# Patient Record
Sex: FEMALE | Race: WHITE | Employment: FULL TIME | URBAN - METROPOLITAN AREA
[De-identification: names, ages, dates, MRNs, and addresses within clinical notes are randomized per-mention and may not be internally consistent; named-entity substitution may affect disease eponyms.]

---

## 2023-09-06 ENCOUNTER — OFFICE VISIT (OUTPATIENT)
Age: 48
End: 2023-09-06
Payer: COMMERCIAL

## 2023-09-06 VITALS
WEIGHT: 238 LBS | BODY MASS INDEX: 38.25 KG/M2 | HEIGHT: 66 IN | HEART RATE: 73 BPM | OXYGEN SATURATION: 97 % | TEMPERATURE: 97.3 F

## 2023-09-06 DIAGNOSIS — M47.812 CERVICAL SPONDYLOSIS WITHOUT MYELOPATHY: ICD-10-CM

## 2023-09-06 DIAGNOSIS — M54.12 CERVICAL NEURITIS: ICD-10-CM

## 2023-09-06 DIAGNOSIS — M96.1 CERVICAL POST-LAMINECTOMY SYNDROME: Primary | ICD-10-CM

## 2023-09-06 PROBLEM — R71.8 MICROCYTIC RED BLOOD CELLS: Status: ACTIVE | Noted: 2023-09-06

## 2023-09-06 PROBLEM — M47.12 CERVICAL SPONDYLOSIS WITH MYELOPATHY: Status: ACTIVE | Noted: 2020-12-16

## 2023-09-06 PROBLEM — M54.6 THORACIC BACK PAIN: Status: ACTIVE | Noted: 2023-09-06

## 2023-09-06 PROBLEM — M54.2 NECK PAIN: Status: ACTIVE | Noted: 2018-10-30

## 2023-09-06 PROBLEM — M25.519 SHOULDER JOINT PAIN: Status: ACTIVE | Noted: 2023-09-06

## 2023-09-06 PROBLEM — I10 ESSENTIAL HYPERTENSION: Status: ACTIVE | Noted: 2019-03-12

## 2023-09-06 PROCEDURE — 99204 OFFICE O/P NEW MOD 45 MIN: CPT | Performed by: PHYSICAL MEDICINE & REHABILITATION

## 2023-09-06 RX ORDER — CYCLOBENZAPRINE HCL 10 MG
10 TABLET ORAL 3 TIMES DAILY PRN
COMMUNITY

## 2023-09-06 RX ORDER — INSULIN GLARGINE 100 [IU]/ML
INJECTION, SOLUTION SUBCUTANEOUS
COMMUNITY
Start: 2023-07-06

## 2023-09-06 RX ORDER — DULAGLUTIDE 0.75 MG/.5ML
INJECTION, SOLUTION SUBCUTANEOUS
COMMUNITY
Start: 2023-07-06

## 2023-09-06 RX ORDER — BACLOFEN 10 MG/1
TABLET ORAL
COMMUNITY
Start: 2023-06-02

## 2023-09-06 RX ORDER — GABAPENTIN 100 MG/1
100 CAPSULE ORAL 3 TIMES DAILY
Qty: 90 CAPSULE | Refills: 1 | Status: SHIPPED | OUTPATIENT
Start: 2023-09-06 | End: 2023-11-05

## 2023-09-06 RX ORDER — ATORVASTATIN CALCIUM 20 MG/1
20 TABLET, FILM COATED ORAL
COMMUNITY
Start: 2023-08-25

## 2023-09-06 RX ORDER — BENZOYL PEROXIDE 100 MG/ML
LIQUID TOPICAL
COMMUNITY
Start: 2023-08-09

## 2023-09-08 ENCOUNTER — HOSPITAL ENCOUNTER (OUTPATIENT)
Facility: HOSPITAL | Age: 48
Setting detail: RECURRING SERIES
Discharge: HOME | End: 2023-09-11
Payer: COMMERCIAL

## 2023-09-08 PROCEDURE — 97162 PT EVAL MOD COMPLEX 30 MIN: CPT

## 2023-09-08 NOTE — PROGRESS NOTES
PHYSICAL / OCCUPATIONAL THERAPY - DAILY TREATMENT NOTE (updated )  For Eval visit    Patient Name: Rabia Cano    Date: 2023    : 1975  Insurance: Payor: Paul Grossman / Plan: [employer] HMO / Product Type: *No Product type* /      Patient  verified yes     Visit #   Current / Total 1 12   Time   In / Out 820 900   Pain   In / Out 6/10 6/10   Subjective Functional Status/Changes: See POC     TREATMENT AREA =  see POC    OBJECTIVE           30 min   Eval - untimed                      Therapeutic Procedures: Tx Min Billable or 1:1 Min (if diff from Tx Min) Procedure, Rationale, Specifics   10  85202 Therapeutic Exercise (timed):  increase ROM, strength, coordination, balance, and proprioception to improve patient's ability to progress to PLOF and address remaining functional goals.  (see flow sheet as applicable)     Details if applicable:              Details if applicable:            Details if applicable:            Details if applicable:            Details if applicable:     10  [facility] Totals Reminder: bill using total billable min of TIMED therapeutic procedures (example: do not include dry needle or estim unattended, both untimed codes, in totals to left)  8-22 min = 1 unit; 23-37 min = 2 units; 38-52 min = 3 units; 53-67 min = 4 units; 68-82 min = 5 units   Total Total     [x]  Patient Education billed concurrently with other procedures   [x] Review HEP    [] Progressed/Changed HEP, detail:    [] Other detail:       Objective Information/Functional Measures/Assessment    See POC    Patient will continue to benefit from skilled PT / OT services to modify and progress therapeutic interventions, analyze and address functional mobility deficits, analyze and address ROM deficits, analyze and address strength deficits, analyze and address soft tissue restrictions, analyze and cue for proper movement patterns, and analyze and modify for postural abnormalities to address functional deficits and attain

## 2023-09-08 NOTE — THERAPY EVALUATION
4/5 147 160   ER 4-/5 4-/5 VALENTE T2 VALENTE T2   IR 4/5 4/5 FIR T10 FIR L1   Elbow Left Right Left Right   Extension 4-/5 4-/5     Flexion 4/5 4/5     Wrist Left Right Left Right    5 lbs 5 lbs            PALPATION: Tenderness to B rhomboids, B cervical paraspinals, B scalenes, B UT/LS  SPECIAL TEST:(+) ULTT (Ulnar n. Bias). Pt presents w/ sx suggesting/consistent with cervical radiculopathy. Pt could benefit from PT to address impairments and functional limitations in order to improve pt's ability to complete ADLs.  ===========================================================================================  Evaluation Complexity:  History:  HIGH Complexity :3+ comorbidities / personal factors will impact the outcome/ POC ; Examination:  HIGH Complexity : 4+ Standardized tests and measures addressing body structure, function, activity limitation and / or participation in recreation  ;Presentation:  MEDIUM Complexity : Evolving with changing characteristics  ; Clinical Decision Making:  MEDIUM Complexity : FOTO score of 26-74 FOTO score = an established functional score where 100 = no disability  Overall Complexity Rating: MEDIUM  Problem List: pain affecting function, decrease ROM, decrease strength, decrease ADL/functional abilities, decrease activity tolerance, and decrease flexibility/joint mobility   Treatment Plan may include any combination of the followin Therapeutic Exercise, 22717 Neuromuscular Re-Education, 38012 Manual Therapy, 66865 Therapeutic Activity, 49880 Self Care/Home Management, and 60606 Electrical Stim unattended  Patient / Family readiness to learn indicated by: asking questions, trying to perform skills, interest, return verbalization , and return demonstration   Persons(s) to be included in education: patient (P)  Barriers to Learning/Limitations: none  Measures taken if barriers to learning present: n/a  Patient Goal (s): pain relief  Patient Self Reported Health Status:

## 2023-09-21 ENCOUNTER — HOSPITAL ENCOUNTER (OUTPATIENT)
Facility: HOSPITAL | Age: 48
Setting detail: RECURRING SERIES
Discharge: HOME | End: 2023-09-24
Payer: COMMERCIAL

## 2023-09-21 PROCEDURE — 97140 MANUAL THERAPY 1/> REGIONS: CPT

## 2023-09-21 PROCEDURE — 97110 THERAPEUTIC EXERCISES: CPT

## 2023-09-21 PROCEDURE — 97530 THERAPEUTIC ACTIVITIES: CPT

## 2023-09-21 NOTE — PROGRESS NOTES
10/3/2023  8:20 AM Rg Sensor, PTA [facility]   10/5/2023  9:00 AM Rg Sensor, PTA [facility]   10/9/2023  9:40 AM Rg Sensor, PTA [facility]   10/13/2023  8:20 AM Thayer Sensor, PTA [facility]   10/18/2023 10:30 AM MD CLEMENTINA Lemos BS AMB

## 2023-09-25 ENCOUNTER — APPOINTMENT (OUTPATIENT)
Facility: HOSPITAL | Age: 48
End: 2023-09-25
Payer: COMMERCIAL

## 2023-09-28 ENCOUNTER — HOSPITAL ENCOUNTER (OUTPATIENT)
Facility: HOSPITAL | Age: 48
Setting detail: RECURRING SERIES
End: 2023-09-28
Payer: COMMERCIAL

## 2023-09-28 PROCEDURE — 97530 THERAPEUTIC ACTIVITIES: CPT

## 2023-09-28 PROCEDURE — 97110 THERAPEUTIC EXERCISES: CPT

## 2023-09-28 PROCEDURE — 97140 MANUAL THERAPY 1/> REGIONS: CPT

## 2023-09-28 NOTE — PROGRESS NOTES
PHYSICAL / OCCUPATIONAL THERAPY - DAILY TREATMENT NOTE (updated )    Patient Name: Magy Salas    Date: 2023    : 1975  Insurance: Payor: Edilma Haywood / Plan: OPTIMA HMO / Product Type: *No Product type* /      Patient  verified Yes     Visit #   Current / Total 3 12   Time   In / Out 10:20 11:27   Pain   In / Out 7/10 5/10   Subjective Functional Status/Changes: I have had a bad week this week, I was sick over the weekend and I was confined to the bed for a few days and  then I had a Zoom meeting on Monday which irritated the pain and numbness in my arms on and off as a result. TREATMENT AREA =  Cervicalgia [M54.2]    OBJECTIVE    Modalities Rationale:     decrease pain and increase tissue extensibility to improve patient's ability to progress to PLOF and address remaining functional goals. min [] Estim Unattended, type/location:                                      []  w/ice    []  w/heat    min [] Estim Attended, type/location:                                     []  w/US     []  w/ice    []  w/heat    []  TENS insruct      min []  Mechanical Traction: type/lbs                   []  pro   []  sup   []  int   []  cont    []  before manual    []  after manual    min []  Ultrasound, settings/location:     10 min  unbill []  Ice     [x]  Heat    location/position: Cervical/seated     min []  Paraffin,  details:     min []  Vasopneumatic Device, press/temp:     min []  Rylee Tucker / Melody Mehnaz: If using vaso (only need to measure limb vaso being performed on)      pre-treatment girth :       post-treatment girth :       measured at (landmark location) :      min []  Other:    Skin assessment post-treatment:   Redness, no adverse reactions      Therapeutic Procedures:     Tx Min Billable or 1:1 Min (if diff from Tx Min) Procedure, Rationale, Specifics   33  34900 Therapeutic Exercise (timed):  increase ROM, strength, coordination, balance, and proprioception to improve patient's ability to progress

## 2023-10-03 ENCOUNTER — HOSPITAL ENCOUNTER (OUTPATIENT)
Facility: HOSPITAL | Age: 48
Setting detail: RECURRING SERIES
Discharge: HOME | End: 2023-10-06
Payer: COMMERCIAL

## 2023-10-03 PROCEDURE — 97530 THERAPEUTIC ACTIVITIES: CPT

## 2023-10-03 PROCEDURE — 97140 MANUAL THERAPY 1/> REGIONS: CPT

## 2023-10-03 PROCEDURE — 97110 THERAPEUTIC EXERCISES: CPT

## 2023-10-03 NOTE — PROGRESS NOTES
PHYSICAL / OCCUPATIONAL THERAPY - DAILY TREATMENT NOTE (updated )    Patient Name: Gideon Quevedo    Date: 10/3/2023    : 1975  Insurance: Payor: Freida Olivas / Plan: OPTIMA HMO / Product Type: *No Product type* /      Patient  verified Yes     Visit #   Current / Total 4 12   Time   In / Out 8:22 9:23   Pain   In / Out 6/10 4-5/10   Subjective Functional Status/Changes: My neck felt really good for several hours after I left here last time before it tightened up later on that night. I have also still been feeling the numbness and tingling going down      TREATMENT AREA =  Cervicalgia [M54.2]    OBJECTIVE    Modalities Rationale:     decrease pain and increase tissue extensibility to improve patient's ability to progress to PLOF and address remaining functional goals. min [] Estim Unattended, type/location:                                      []  w/ice    []  w/heat    min [] Estim Attended, type/location:                                     []  w/US     []  w/ice    []  w/heat    []  TENS insruct      min []  Mechanical Traction: type/lbs                   []  pro   []  sup   []  int   []  cont    []  before manual    []  after manual    min []  Ultrasound, settings/location:     10 min  unbill []  Ice     [x]  Heat    location/position: Cervical/lumbar in sitting     min []  Paraffin,  details:     min []  Vasopneumatic Device, press/temp:     min []  Roseline Martinez / Oanh Lasso: If using vaso (only need to measure limb vaso being performed on)      pre-treatment girth :       post-treatment girth :       measured at (landmark location) :      min []  Other:    Skin assessment post-treatment:   Redness, no adverse reactions      Therapeutic Procedures:     Tx Min Billable or 1:1 Min (if diff from Tx Min) Procedure, Rationale, Specifics   21  19759 Therapeutic Exercise (timed):  increase ROM, strength, coordination, balance, and proprioception to improve patient's ability to progress to PLOF and address

## 2023-10-05 ENCOUNTER — APPOINTMENT (OUTPATIENT)
Facility: HOSPITAL | Age: 48
End: 2023-10-05
Payer: COMMERCIAL

## 2023-10-06 ENCOUNTER — HOSPITAL ENCOUNTER (OUTPATIENT)
Facility: HOSPITAL | Age: 48
Setting detail: RECURRING SERIES
Discharge: HOME | End: 2023-10-09
Payer: COMMERCIAL

## 2023-10-06 PROCEDURE — 97530 THERAPEUTIC ACTIVITIES: CPT

## 2023-10-06 PROCEDURE — 97110 THERAPEUTIC EXERCISES: CPT

## 2023-10-06 NOTE — PROGRESS NOTES
[id number] [employer] [facility]  [address] [address]  Phone: [phone]   [phone]  PHYSICAL THERAPY PROGRESS NOTE  Patient Name: Deven Ramirez : 1975   Treatment/Medical Diagnosis: Cervicalgia [M54.2]   Referral Source: Maine Ambrose MD     Date of Initial Visit: 23 Attended Visits: 5 Missed Visits: 1     SUMMARY OF TREATMENT  Therapeutic exercise for cervical mobility and strengthening, postural awareness, cervicothoracic stabilization, manual intervention, moist heat, patient education and HEP. CURRENT STATUS  % improvement: 58-60%  Max pain 9/10 first thing in the morning with initial activity upon rising this dissipates with activity as well as with prolonged/repetitive reaching and lifting type ADL's. Avg pain 6/10  Pt also reports intermittent bilateral R>L UE radicular burning/numbness/tingling/paresthesia down to all digits at time especially with prolonged/repetitive UE involved ADL's. Current improvements: Pt reports the most functional improvement with slight increase in cervical mobility since initial evaluation. Remaining functional limitations: Increased limitations/pain/symptoms first thing in the morning with initial activity upon rising this dissipates with activity as well as with prolonged/repetitive reaching and lifting type ADL's.     Objective measures:  Cervical AROM= Flexion= 30 deg; Extension= 16 deg; Side bending= R= 20 deg, L= 23 deg; Rotation= R= 36 deg, L= 46 deg      UE/shoulder strength measurements/MMT= (R/L)= Flexion= 4-/5, 4-/5; Scaption/Abduction= 4-/5, 4-/5; ER= 4-/5, 4/5; IR= 4/5, 4/5      strength= R= 3 lbs, L= 5 lbs     FOTO 39/100    SHORT TERM GOALS:  Patient will demonstrate independence with HEP for self management of symptoms  Status at last Eval: initial HEP established and issued   Current Status: Pt reports independence and compliance 1-2x/day since initial

## 2023-10-06 NOTE — PROGRESS NOTES
PHYSICAL / OCCUPATIONAL THERAPY - DAILY TREATMENT NOTE (updated )    Patient Name: Pilar Faustin    Date: 10/6/2023    : 1975  Insurance: Payor: Mariah Carmona / Plan: OPTIMA HMO / Product Type: *No Product type* /      Patient  verified Yes     Visit #   Current / Total 5 12   Time   In / Out 12:22 1:14   Pain   In / Out 6/10 4/10   Subjective Functional Status/Changes: I'm not feeling too bad right now, but I was hurting and really stiff for the first couple of hours after I got out of bed this morning. TREATMENT AREA =  Cervicalgia [M54.2]    OBJECTIVE    Modalities Rationale:     decrease pain and increase tissue extensibility to improve patient's ability to progress to PLOF and address remaining functional goals. min [] Estim Unattended, type/location:                                      []  w/ice    []  w/heat    min [] Estim Attended, type/location:                                     []  w/US     []  w/ice    []  w/heat    []  TENS insruct      min []  Mechanical Traction: type/lbs                   []  pro   []  sup   []  int   []  cont    []  before manual    []  after manual    min []  Ultrasound, settings/location:     10 min  unbill []  Ice     [x]  Heat    location/position: Cervical/seated    min []  Paraffin,  details:     min []  Vasopneumatic Device, press/temp:     min []  Charlann Dues / Exie Larson: If using vaso (only need to measure limb vaso being performed on)      pre-treatment girth :       post-treatment girth :       measured at (landmark location) :      min []  Other:    Skin assessment post-treatment:   Redness, no adverse reactions      Therapeutic Procedures: Tx Min Billable or 1:1 Min (if diff from Tx Min) Procedure, Rationale, Specifics   19  10058 Therapeutic Exercise (timed):  increase ROM, strength, coordination, balance, and proprioception to improve patient's ability to progress to PLOF and address remaining functional goals.  (see flow sheet as applicable)

## 2023-10-09 ENCOUNTER — HOSPITAL ENCOUNTER (OUTPATIENT)
Facility: HOSPITAL | Age: 48
Setting detail: RECURRING SERIES
Discharge: HOME | End: 2023-10-12
Payer: COMMERCIAL

## 2023-10-09 PROCEDURE — 97110 THERAPEUTIC EXERCISES: CPT

## 2023-10-09 PROCEDURE — 97140 MANUAL THERAPY 1/> REGIONS: CPT

## 2023-10-09 PROCEDURE — 97530 THERAPEUTIC ACTIVITIES: CPT

## 2023-10-09 NOTE — PROGRESS NOTES
PHYSICAL / OCCUPATIONAL THERAPY - DAILY TREATMENT NOTE (updated )    Patient Name: Jonathan Sanchez    Date: 10/9/2023    : 1975  Insurance: Payor: Shawanda Memory / Plan: OPTIMA HMO / Product Type: *No Product type* /      Patient  verified Yes     Visit #   Current / Total 6 12   Time   In / Out 9:44 10:40   Pain   In / Out 1-2/10 0/10   Subjective Functional Status/Changes: I didn't really do that much over that much over the weekend, so my neck feels pretty good pain wise, but I still feel the tingling going down to my hand and fingers like a glove. TREATMENT AREA =  Cervicalgia [M54.2]    OBJECTIVE         Therapeutic Procedures: Tx Min Billable or 1:1 Min (if diff from Tx Min) Procedure, Rationale, Specifics   36  50068 Therapeutic Exercise (timed):  increase ROM, strength, coordination, balance, and proprioception to improve patient's ability to progress to PLOF and address remaining functional goals. (see flow sheet as applicable)     Details if applicable:       10  83497 Therapeutic Activity (timed):  use of dynamic activities replicating functional movements to increase ROM, strength, coordination, balance, and proprioception in order to improve patient's ability to progress to PLOF and address remaining functional goals. (see flow sheet as applicable)     Details if applicable:     10  50616 Manual Therapy (timed):  decrease pain, increase ROM, increase tissue extensibility, decrease trigger points, and increase postural awareness to improve patient's ability to progress to PLOF and address remaining functional goals. The manual therapy interventions were performed at a separate and distinct time from the therapeutic activities interventions .  (see flow sheet as applicable)     Details if applicable:  STM/tissue mobs to B cervical paraspinals, upper traps and levator scapulae in sitting with leaning forward over pillow on plinth            Details if applicable:            Details if

## 2023-10-13 ENCOUNTER — HOSPITAL ENCOUNTER (OUTPATIENT)
Facility: HOSPITAL | Age: 48
Setting detail: RECURRING SERIES
Discharge: HOME | End: 2023-10-16
Payer: COMMERCIAL

## 2023-10-13 PROCEDURE — 97110 THERAPEUTIC EXERCISES: CPT

## 2023-10-13 PROCEDURE — 97530 THERAPEUTIC ACTIVITIES: CPT

## 2023-10-13 PROCEDURE — 97140 MANUAL THERAPY 1/> REGIONS: CPT

## 2023-10-13 NOTE — PROGRESS NOTES
PHYSICAL / OCCUPATIONAL THERAPY - DAILY TREATMENT NOTE (updated )    Patient Name: Sukhdev Villanueva    Date: 10/13/2023    : 1975  Insurance: Payor: Beatrice Hudson / Plan: OPTIMA HMO / Product Type: *No Product type* /      Patient  verified Yes     Visit #   Current / Total 7 12   Time   In / Out 8:20 9:03   Pain   In / Out 3-4/10  0/10   Subjective Functional Status/Changes: I am doing pretty good, but I can feel a big knot in the left side of my neck since I was here last that my  has been working on. TREATMENT AREA =  Cervicalgia [M54.2]    OBJECTIVE         Therapeutic Procedures: Tx Min Billable or 1:1 Min (if diff from Tx Min) Procedure, Rationale, Specifics   23  74468 Therapeutic Exercise (timed):  increase ROM, strength, coordination, balance, and proprioception to improve patient's ability to progress to PLOF and address remaining functional goals. (see flow sheet as applicable)     Details if applicable:       10  56186 Therapeutic Activity (timed):  use of dynamic activities replicating functional movements to increase ROM, strength, coordination, balance, and proprioception in order to improve patient's ability to progress to PLOF and address remaining functional goals. (see flow sheet as applicable)     Details if applicable:     10  71839 Manual Therapy (timed):  decrease pain, increase ROM, increase tissue extensibility, decrease trigger points, and increase postural awareness to improve patient's ability to progress to PLOF and address remaining functional goals. The manual therapy interventions were performed at a separate and distinct time from the therapeutic activities interventions .  (see flow sheet as applicable)     Details if applicable:   STM/tissue mobs to L>R cervical paraspinals, upper traps and levator scapulae in sitting with leaning forward over pillow on plinth            Details if applicable:            Details if applicable:     37  [facility] Totals Reminder:

## 2023-10-16 ENCOUNTER — HOSPITAL ENCOUNTER (OUTPATIENT)
Facility: HOSPITAL | Age: 48
Setting detail: RECURRING SERIES
Discharge: HOME | End: 2023-10-19
Payer: COMMERCIAL

## 2023-10-16 PROCEDURE — 97140 MANUAL THERAPY 1/> REGIONS: CPT

## 2023-10-16 PROCEDURE — 97112 NEUROMUSCULAR REEDUCATION: CPT

## 2023-10-16 PROCEDURE — 97110 THERAPEUTIC EXERCISES: CPT

## 2023-10-16 NOTE — PROGRESS NOTES
PHYSICAL / OCCUPATIONAL THERAPY - DAILY TREATMENT NOTE (updated )    Patient Name: Abbi Cunningham    Date: 10/16/2023    : 1975  Insurance: Payor: Miguel Huerta / Plan: OPTIMA HMO / Product Type: *No Product type* /      Patient  verified Yes     Visit #   Current / Total 8 12   Time   In / Out 140 233   Pain   In / Out 5/10 3-4/10   Subjective Functional Status/Changes: Patient reports she has been doing better with PT overall as she feels she has more mobility. Patient does note continued burning into her arms and hands and feels like the frequency of this has increased. TREATMENT AREA =  Cervicalgia [M54.2]    OBJECTIVE         Therapeutic Procedures: Tx Min Billable or 1:1 Min (if diff from Tx Min) Procedure, Rationale, Specifics   25  17308 Therapeutic Exercise (timed):  increase ROM, strength, coordination, balance, and proprioception to improve patient's ability to progress to PLOF and address remaining functional goals. (see flow sheet as applicable)     Details if applicable:       16  05003 Neuromuscular Re-Education (timed):  improve balance, coordination, kinesthetic sense, posture, core stability and proprioception to improve patient's ability to develop conscious control of individual muscles and awareness of position of extremities in order to progress to PLOF and address remaining functional goals. (see flow sheet as applicable)     Details if applicable:     12  93040 Manual Therapy (timed):  decrease pain, increase ROM, increase tissue extensibility, and decrease trigger points to improve patient's ability to progress to PLOF and address remaining functional goals. The manual therapy interventions were performed at a separate and distinct time from the therapeutic activities interventions .  (see flow sheet as applicable)     Details if applicable:  STM to L cervical paraspinals and scalenes, L UT, SOR; manual cervical traction all in supine with bolster          Details if
Benefits, risks, and possible complications of procedure explained to patient/caregiver who verbalized understanding and gave verbal consent./initial I/D performed 10/23 at which time written consent was obtained
Benefits, risks, and possible complications of procedure explained to patient/caregiver who verbalized understanding and gave verbal consent.

## 2023-10-18 ENCOUNTER — OFFICE VISIT (OUTPATIENT)
Age: 48
End: 2023-10-18
Payer: COMMERCIAL

## 2023-10-18 VITALS
BODY MASS INDEX: 38.99 KG/M2 | HEIGHT: 66 IN | WEIGHT: 242.6 LBS | HEART RATE: 83 BPM | OXYGEN SATURATION: 98 % | TEMPERATURE: 97.1 F

## 2023-10-18 DIAGNOSIS — M47.812 CERVICAL SPONDYLOSIS WITHOUT MYELOPATHY: ICD-10-CM

## 2023-10-18 DIAGNOSIS — M54.6 THORACIC BACK PAIN, UNSPECIFIED BACK PAIN LATERALITY, UNSPECIFIED CHRONICITY: ICD-10-CM

## 2023-10-18 DIAGNOSIS — M96.1 CERVICAL POST-LAMINECTOMY SYNDROME: Primary | ICD-10-CM

## 2023-10-18 DIAGNOSIS — M54.12 CERVICAL NEURITIS: ICD-10-CM

## 2023-10-18 PROCEDURE — 99214 OFFICE O/P EST MOD 30 MIN: CPT | Performed by: PHYSICAL MEDICINE & REHABILITATION

## 2023-10-18 RX ORDER — GABAPENTIN 300 MG/1
300 CAPSULE ORAL 3 TIMES DAILY
Qty: 90 CAPSULE | Refills: 1 | Status: CANCELLED | OUTPATIENT
Start: 2023-10-18 | End: 2023-12-17

## 2023-10-18 RX ORDER — TOPIRAMATE 50 MG/1
50 TABLET, FILM COATED ORAL
COMMUNITY
Start: 2023-09-26

## 2023-10-18 RX ORDER — ALBUTEROL SULFATE 90 UG/1
AEROSOL, METERED RESPIRATORY (INHALATION)
COMMUNITY
Start: 2023-09-21

## 2023-10-19 ENCOUNTER — APPOINTMENT (OUTPATIENT)
Facility: HOSPITAL | Age: 48
End: 2023-10-19
Payer: COMMERCIAL

## 2023-10-20 NOTE — PROGRESS NOTES
PHYSICAL / OCCUPATIONAL THERAPY - DAILY TREATMENT NOTE (updated )    Patient Name: Edilma Delaney    Date: 10/20/2023    : 1975  Insurance: Payor: Sidney Antionet / Plan: OPTIMA HMO / Product Type: *No Product type* /      Patient  verified Yes     Visit #   Current / Total 9 12   Time   In / Out 8:25 9:29   Pain   In / Out 0 0   Subjective Functional Status/Changes: Pt reports intermittent N/T to L>R UE with pec stretching. C/o tension/pain to neck     TREATMENT AREA =  Cervicalgia [M54.2]    OBJECTIVE    Modalities Rationale:     decrease pain and increase tissue extensibility to improve patient's ability to progress to PLOF and address remaining functional goals. min [] Estim Unattended, type/location:                                      []  w/ice    []  w/heat    min [] Estim Attended, type/location:                                     []  w/US     []  w/ice    []  w/heat    []  TENS insruct      min []  Mechanical Traction: type/lbs                   []  pro   []  sup   []  int   []  cont    []  before manual    []  after manual    min []  Ultrasound, settings/location:     10 min  unbill []  Ice     [x]  Heat    location/position: C/s in short sitting    min []  Paraffin,  details:     min []  Vasopneumatic Device, press/temp:     min []  Garrett Hernandez / Peri Sheri: If using vaso (only need to measure limb vaso being performed on)      pre-treatment girth :       post-treatment girth :       measured at (landmark location) :      min []  Other:    Skin assessment post-treatment:   Intact      Therapeutic Procedures: Tx Min Billable or 1:1 Min (if diff from Tx Min) Procedure, Rationale, Specifics   34  43511 Therapeutic Exercise (timed):  increase ROM, strength, coordination, balance, and proprioception to improve patient's ability to progress to PLOF and address remaining functional goals.  (see flow sheet as applicable)     Details if applicable:       10  66078 Neuromuscular Re-Education (timed):

## 2023-10-23 ENCOUNTER — HOSPITAL ENCOUNTER (OUTPATIENT)
Facility: HOSPITAL | Age: 48
Setting detail: RECURRING SERIES
Discharge: HOME | End: 2023-10-26
Payer: COMMERCIAL

## 2023-10-23 PROCEDURE — 97112 NEUROMUSCULAR REEDUCATION: CPT

## 2023-10-23 PROCEDURE — 97140 MANUAL THERAPY 1/> REGIONS: CPT

## 2023-10-23 PROCEDURE — 97110 THERAPEUTIC EXERCISES: CPT

## 2023-10-26 ENCOUNTER — HOSPITAL ENCOUNTER (OUTPATIENT)
Facility: HOSPITAL | Age: 48
Setting detail: RECURRING SERIES
Discharge: HOME | End: 2023-10-29
Payer: COMMERCIAL

## 2023-10-26 PROCEDURE — 97110 THERAPEUTIC EXERCISES: CPT

## 2023-10-26 PROCEDURE — 97530 THERAPEUTIC ACTIVITIES: CPT

## 2023-10-26 PROCEDURE — 97140 MANUAL THERAPY 1/> REGIONS: CPT

## 2023-10-26 NOTE — PROGRESS NOTES
PHYSICAL / OCCUPATIONAL THERAPY - DAILY TREATMENT NOTE (updated )    Patient Name: Latonia Calvo    Date: 10/26/2023    : 1975  Insurance: Payor: Sonya Baca / Plan: OPTIMA HMO / Product Type: *No Product type* /      Patient  verified Yes     Visit #   Current / Total 10 12   Time   In / Out 1020 1052   Pain   In / Out 0/10 0/10   Subjective Functional Status/Changes: Patient states her neck is feeling well today and she hasn't experienced any HA's in 1-2 weeks. Patient is receiving an EMG today to assess her carpel tunnel. TREATMENT AREA =  Cervicalgia [M54.2]    OBJECTIVE         Therapeutic Procedures: Tx Min Billable or 1:1 Min (if diff from Tx Min) Procedure, Rationale, Specifics   10  87999 Therapeutic Exercise (timed):  increase ROM, strength, coordination, balance, and proprioception to improve patient's ability to progress to PLOF and address remaining functional goals. (see flow sheet as applicable)     Details if applicable:       10  90680 Therapeutic Activity (timed):  use of dynamic activities replicating functional movements to increase ROM, strength, coordination, balance, and proprioception in order to improve patient's ability to progress to PLOF and address remaining functional goals. (see flow sheet as applicable)     Details if applicable:     13  78036 Manual Therapy (timed):  decrease pain, increase ROM, increase tissue extensibility, and decrease trigger points to improve patient's ability to progress to PLOF and address remaining functional goals. The manual therapy interventions were performed at a separate and distinct time from the therapeutic activities interventions .  (see flow sheet as applicable)     Details if applicable:  STM to L cervical paraspinals and scalenes; SOR; manual cervical traction all performed in supine          Details if applicable:            Details if applicable:     35  [facility] Totals Reminder: bill using total billable min of TIMED

## 2023-10-31 ENCOUNTER — APPOINTMENT (OUTPATIENT)
Facility: HOSPITAL | Age: 48
End: 2023-10-31
Payer: COMMERCIAL

## 2023-11-02 ENCOUNTER — HOSPITAL ENCOUNTER (OUTPATIENT)
Facility: HOSPITAL | Age: 48
Setting detail: RECURRING SERIES
Discharge: HOME | End: 2023-11-05
Payer: COMMERCIAL

## 2023-11-02 PROCEDURE — 97110 THERAPEUTIC EXERCISES: CPT

## 2023-11-02 PROCEDURE — 97530 THERAPEUTIC ACTIVITIES: CPT

## 2023-11-02 NOTE — PROGRESS NOTES
PHYSICAL / OCCUPATIONAL THERAPY - DAILY TREATMENT NOTE (updated )    Patient Name: Jean Marie Nice    Date: 2023    : 1975  Insurance: Payor: Loraine Mancuso / Plan: [employer] HMO / Product Type: *No Product type* /      Patient  verified Yes     Visit #   Current / Total 11 12   Time   In / Out 1:00 2:09   Pain   In / Out 4 4   Subjective Functional Status/Changes: Pt states EMG completed after last session; she hasn't gotten official results but she was told that she had CTS and radicular sx's. Pt inquires whether she is able to print her PT notes for Dr. Nichole Moreno for her disability. Pt reports overall improvement as 50-55% since [facility]  Current functional improvements: \"I have better mobility in my neck and also a bit more mobility in my arms\"     Functional Limitations: lifting, reaching (I.e. to cabinets and to groom hair), turning head quickly. TREATMENT AREA =  Cervicalgia [M54.2]    OBJECTIVE         Therapeutic Procedures: Tx Min Billable or 1:1 Min (if diff from Tx Min) Procedure, Rationale, Specifics   34  [id number] Therapeutic Exercise (timed):  increase ROM, strength, coordination, balance, and proprioception to improve patient's ability to progress to PLOF and address remaining functional goals. (see flow sheet as applicable)     Details if applicable:       35  [id number] Therapeutic Activity (timed):  use of dynamic activities replicating functional movements to increase ROM, strength, coordination, balance, and proprioception in order to improve patient's ability to progress to PLOF and address remaining functional goals.   (see flow sheet as applicable)     Details if applicable:    -including thorough reassessment for PN               Details if applicable:            Details if applicable:     37  [address] Totals Reminder: bill using total billable min of TIMED therapeutic procedures (example: do not include dry needle or estim unattended, both untimed codes, in totals to left)  8-22 min = 1
strength=  R= 2 lbs, L= 5 lbs  Current:     6. Pt will improve deep cervical neck flexor endurance to >/=5\" for improved axial stability with ADL's. Status at last note/certification: 05/3: unable to tuck/lift 2/2 weakness and pain  Current:         Frequency / Duration:   Patient to be seen   1-2   times per week for   8    treatments:    RECOMMENDATIONS  We would like to continue therapy for progress to goals stated above. Continue per initial Plan of Care. If you have any questions/comments please contact us directly. Thank you for allowing us to assist in the care of your patient.     Cliff Downey, PT       11/2/2023       1:59 PM

## 2023-11-06 ENCOUNTER — HOSPITAL ENCOUNTER (OUTPATIENT)
Facility: HOSPITAL | Age: 48
Setting detail: RECURRING SERIES
Discharge: HOME | End: 2023-11-09
Payer: COMMERCIAL

## 2023-11-06 PROCEDURE — 97530 THERAPEUTIC ACTIVITIES: CPT

## 2023-11-06 PROCEDURE — 97110 THERAPEUTIC EXERCISES: CPT

## 2023-11-06 NOTE — PROGRESS NOTES
PHYSICAL / OCCUPATIONAL THERAPY - DAILY TREATMENT NOTE (updated )    Patient Name: Radha Dickinson    Date: 2023    : 1975  Insurance: Payor: Ermelinda Mcleod / Plan: [employer] HMO / Product Type: *No Product type* /      Patient  verified Yes     Visit #   Current / Total 12 19   Time   In / Out 11:42 12:33   Pain   In / Out 2-3/10 0/10   Subjective Functional Status/Changes: My neck doesn't feel too bad today, but I did have a rough day yesterday. I don't remember doing anything different that would have flared it up, but sometimes I just wake up that way. TREATMENT AREA =  Cervicalgia [M54.2]    OBJECTIVE    Modalities Rationale:     decrease pain and increase tissue extensibility to improve patient's ability to progress to PLOF and address remaining functional goals. min [] Estim Unattended, type/location:                                      []  w/ice    []  w/heat    min [] Estim Attended, type/location:                                     []  w/US     []  w/ice    []  w/heat    []  TENS insruct      min []  Mechanical Traction: type/lbs                   []  pro   []  sup   []  int   []  cont    []  before manual    []  after manual    min []  Ultrasound, settings/location:     10 min  unbill []  Ice     [x]  Heat    location/position: Cervical/seated     min []  Paraffin,  details:     min []  Vasopneumatic Device, press/temp:     min []  Carroll Pouch / Uma Gobble: If using vaso (only need to measure limb vaso being performed on)      pre-treatment girth :       post-treatment girth :       measured at (landmark location) :      min []  Other:    Skin assessment post-treatment:   Redness, no adverse reactions      Therapeutic Procedures:     Tx Min Billable or 1:1 Min (if diff from Tx Min) Procedure, Rationale, Specifics   31  [id number] Therapeutic Exercise (timed):  increase ROM, strength, coordination, balance, and proprioception to improve patient's ability to progress to PLOF and address remaining

## 2023-11-08 ENCOUNTER — HOSPITAL ENCOUNTER (OUTPATIENT)
Facility: HOSPITAL | Age: 48
Setting detail: RECURRING SERIES
Discharge: HOME | End: 2023-11-11
Payer: COMMERCIAL

## 2023-11-08 PROCEDURE — 97110 THERAPEUTIC EXERCISES: CPT

## 2023-11-08 PROCEDURE — 97530 THERAPEUTIC ACTIVITIES: CPT

## 2023-11-08 NOTE — PROGRESS NOTES
address soft tissue restrictions, analyze and cue for proper movement patterns, analyze and modify for postural abnormalities, and instruct in home and community integration to address functional deficits and attain remaining goals. Progress toward goals / Updated goals:  []  See Progress Note/Recertification  New Goals to be achieved in __8__ treatments  1. Patient will report reduction in pain at worst to 6-7/10 in order to improve sleeping habits. Status at last note/certification: 08/1: 9/10  Current:   11/8/23:progress, 7-8/10 pain level at the worst over the past week  2. Patient will improve b/l mid/low trap strength by 1/3 MMT for improved C/T stability with ADL's. Status at last note/certification: 21/0: Mid traps R 3/5, L 3-/5. Low traps R 2/5 (p!), L 2-/5 (p!)  Current:     3. Patient will improve FOTO to >/= 55/100 in order to improve quality of life. Status at last note/certification: 53/3: 34/100  Current: 4. Patient will improve B flexion and abduction strength by 1/3 MMT grade in order to improve tolerance to lifting activities. Status at last note/certification: 67/6: Flex R 4-/5 (p!), L 4/5. ABD b/l 4/5; pt reminded on HEP compliance  Current: 5. Patient will improve B  strength by 10lbs in order to improve tolerance to work activities. Status at last note/certification:  89/5:  strength=  R= 2 lbs, L= 5 lbs  Current:     6. Pt will improve deep cervical neck flexor endurance to >/=5\" for improved axial stability with ADL's.   Status at last note/certification: 28/5: unable to tuck/lift 2/2 weakness and pain  Current:       Next PN/ RC due 12/2/23  Auth due 12/31/23 before 15 visits    PLAN  - Continue Plan of WILLIS Tovar    11/8/2023    11:36 AM    Future Appointments   Date Time Provider [id number]  [address]   11/8/2023 11:40 AM Atiya Donnelly PTA [facility]   11/17/2023  9:15 AM Jennifer Cobb MD [facility]   11/21/2023  1:00 PM Roosevelt Salas, PT

## 2023-11-17 ENCOUNTER — OFFICE VISIT (OUTPATIENT)
Age: 48
End: 2023-11-17

## 2023-11-17 VITALS
OXYGEN SATURATION: 94 % | BODY MASS INDEX: 38.73 KG/M2 | WEIGHT: 241 LBS | HEIGHT: 66 IN | TEMPERATURE: 97.1 F | HEART RATE: 82 BPM

## 2023-11-17 DIAGNOSIS — G56.03 BILATERAL CARPAL TUNNEL SYNDROME: ICD-10-CM

## 2023-11-17 DIAGNOSIS — M96.1 CERVICAL POST-LAMINECTOMY SYNDROME: Primary | ICD-10-CM

## 2023-11-17 DIAGNOSIS — M47.812 CERVICAL SPONDYLOSIS WITHOUT MYELOPATHY: ICD-10-CM

## 2023-11-17 DIAGNOSIS — M54.12 CERVICAL NEURITIS: ICD-10-CM

## 2023-11-17 RX ORDER — TOPIRAMATE 100 MG/1
100 TABLET, FILM COATED ORAL
COMMUNITY
Start: 2023-10-31

## 2023-11-17 NOTE — PROGRESS NOTES
[facility]  [address]  [address]  [place], [place]  Tel: [phone]  Fax: [phone]          PROGRESS NOTE      HISTORY OF PRESENT ILLNESS:  The patient is a 53 y.o. female and was seen today for follow up of neck pain (bilateral scapular pain) radiating to the BUE(alternates) to the hands, involves all digits of both hands. Patient notes she has also been told she has CTS. Patient had surgery in 2016 for her neck pain radiating to the BUE. Patient had relief with the surgery for a couple of years. Patient says her pain is progressive in nature. She denies loss of balance, falls, or impairments manual dexterity. Her pain is exacerbated by no position. Patient denies recent fevers, weight loss, rashes, or skin sores. No stomach ulcers or bleeding disorders. No history of spinal injections. Patient denies recent physical therapy or chiropractic care. Patient reports that to her knowledge her kidneys function properly, GFR over 60 on 8/9/2023. Patient's blood sugar is over 200 and she is managed by Cristela Oleary, [facility], endocrinology. Patient has taken Baclofen w/o relief and Flexeril with relief. The patient is Right Handed. PmHx of DM (A1C of 11 on 8/9/2023), C4-C7 fusion in 2016. Cervical spine XR dated 8/22/2023 films independently reviewed by me. Per report, Satisfactory alignment of C4-C7 hardware without acute osseous abnormality in the cervical spine. Thoracic spine XR dated 8/22/2023 films not independently reviewed. Per report, negative for fracture or traumatic subluxation of the thoracic spine. At her last clinical appointment,  I recommended she continue going to Physical Therapy, and start performing her HEP every day. I discussed spinal surgery and injections, but the patient was not interested in surgery or injection at this time. Patient has been tolerating the Gabapentin 100 mg TID.  Consideration was given to increasing the Gabapentin, but the

## 2023-11-21 ENCOUNTER — APPOINTMENT (OUTPATIENT)
Facility: HOSPITAL | Age: 48
End: 2023-11-21
Payer: COMMERCIAL

## 2023-11-29 ENCOUNTER — TELEMEDICINE (OUTPATIENT)
Age: 48
End: 2023-11-29
Payer: COMMERCIAL

## 2023-11-29 DIAGNOSIS — M96.1 CERVICAL POST-LAMINECTOMY SYNDROME: Primary | ICD-10-CM

## 2023-11-29 DIAGNOSIS — G56.03 BILATERAL CARPAL TUNNEL SYNDROME: ICD-10-CM

## 2023-11-29 PROCEDURE — 99443 PR PHYS/QHP TELEPHONE EVALUATION 21-30 MIN: CPT | Performed by: NURSE PRACTITIONER

## 2023-11-29 NOTE — PROGRESS NOTES
this call serves to triage the patient into an appointment for the relevant concern    ENZO Oliver - NP

## 2023-12-01 ENCOUNTER — TELEPHONE (OUTPATIENT)
Facility: HOSPITAL | Age: 48
End: 2023-12-01

## 2023-12-01 NOTE — TELEPHONE ENCOUNTER
PT received call from pt who informs that she is currently receiving Iron infusions and struggling with low energy such that she does not have the capacity to come in to therapy yet. She hopes to resume PT as soon as her levels improve and pt advised that we will keep chart open with plan for reassessment when she is able to return.

## 2025-03-13 ENCOUNTER — OFFICE VISIT (OUTPATIENT)
Age: 50
End: 2025-03-13
Payer: MEDICAID

## 2025-03-13 VITALS
BODY MASS INDEX: 36.48 KG/M2 | WEIGHT: 227 LBS | TEMPERATURE: 98 F | HEART RATE: 86 BPM | HEIGHT: 66 IN | OXYGEN SATURATION: 98 %

## 2025-03-13 DIAGNOSIS — M54.12 CERVICAL NEURITIS: ICD-10-CM

## 2025-03-13 DIAGNOSIS — M50.30 DDD (DEGENERATIVE DISC DISEASE), CERVICAL: ICD-10-CM

## 2025-03-13 DIAGNOSIS — M47.12 CERVICAL SPONDYLOSIS WITH MYELOPATHY: ICD-10-CM

## 2025-03-13 DIAGNOSIS — M96.1 CERVICAL POST-LAMINECTOMY SYNDROME: ICD-10-CM

## 2025-03-13 DIAGNOSIS — M96.1 CERVICAL POST-LAMINECTOMY SYNDROME: Primary | ICD-10-CM

## 2025-03-13 PROCEDURE — 72040 X-RAY EXAM NECK SPINE 2-3 VW: CPT | Performed by: PHYSICAL MEDICINE & REHABILITATION

## 2025-03-13 PROCEDURE — 99214 OFFICE O/P EST MOD 30 MIN: CPT | Performed by: PHYSICAL MEDICINE & REHABILITATION

## 2025-03-13 RX ORDER — DIAZEPAM 10 MG/1
TABLET ORAL
Qty: 1 TABLET | Refills: 0 | Status: SHIPPED | OUTPATIENT
Start: 2025-03-13 | End: 2026-03-13

## 2025-03-13 RX ORDER — MELOXICAM 15 MG/1
15 TABLET ORAL DAILY
Qty: 30 TABLET | Refills: 0 | Status: SHIPPED | OUTPATIENT
Start: 2025-03-13

## 2025-03-13 RX ORDER — EMPAGLIFLOZIN 25 MG/1
TABLET, FILM COATED ORAL
COMMUNITY
Start: 2024-10-24 | End: 2025-04-22

## 2025-03-13 NOTE — PROGRESS NOTES
[facility]  [address]  [place] [address]  Tel: [phone]  Fax: [phone]          PROGRESS NOTE      HISTORY OF PRESENT ILLNESS:  The patient is a 49 y.o. female and was seen today for follow up of neck pain and spasms with symptoms into the left shoulder, radiating to the proximal portion of the LUE extending posteriorly to the elbow. Previously seen for neck pain (bilateral scapular pain) radiating to the BUE(alternates) to the hands, involves all digits of both hands. Patient notes she has also been told she has CTS. Patient had surgery in 2016 for her neck pain radiating to the BUE. Patient had relief with the surgery for a couple of years. Patient says her pain is progressive in nature. She denies loss of balance, falls, or impairments manual dexterity. Her pain is exacerbated by no position. Patient denies recent fevers, weight loss, rashes, or skin sores. No stomach ulcers or bleeding disorders. No history of spinal injections. Patient denies recent physical therapy or chiropractic care. Patient's blood sugar is over 200 and she is managed by Janay Carney, [facility], endocrinology. Patient has taken Baclofen w/o relief and Flexeril with relief. The patient is Right Handed. PmHx of DM (A1C of 11 on 8/9/2023), C4-C7 fusion in 2016. Cervical spine XR dated 8/22/2023 films independently reviewed by me. Per report, Satisfactory alignment of C4-C7 hardware without acute osseous abnormality in the cervical spine. Thoracic spine XR dated 8/22/2023 films not independently reviewed. Per report, negative for fracture or traumatic subluxation of the thoracic spine. I recommended she continue going to Physical Therapy, and start performing her HEP every day. I discussed spinal surgery and injections, but the patient was not interested in surgery or injection at this time. Patient has been tolerating the Gabapentin 100 mg TID. Consideration was given to increasing

## 2025-04-10 DIAGNOSIS — M47.12 CERVICAL SPONDYLOSIS WITH MYELOPATHY: ICD-10-CM

## 2025-04-10 DIAGNOSIS — M54.12 CERVICAL NEURITIS: ICD-10-CM

## 2025-04-10 DIAGNOSIS — M96.1 CERVICAL POST-LAMINECTOMY SYNDROME: ICD-10-CM

## 2025-04-10 DIAGNOSIS — M50.30 DDD (DEGENERATIVE DISC DISEASE), CERVICAL: ICD-10-CM

## 2025-04-10 RX ORDER — MELOXICAM 15 MG/1
15 TABLET ORAL DAILY
Qty: 30 TABLET | Refills: 0 | OUTPATIENT
Start: 2025-04-10

## 2025-05-12 DIAGNOSIS — M54.12 CERVICAL NEURITIS: ICD-10-CM

## 2025-05-12 DIAGNOSIS — M47.12 CERVICAL SPONDYLOSIS WITH MYELOPATHY: ICD-10-CM

## 2025-05-12 DIAGNOSIS — M96.1 CERVICAL POST-LAMINECTOMY SYNDROME: ICD-10-CM

## 2025-05-12 DIAGNOSIS — M50.30 DDD (DEGENERATIVE DISC DISEASE), CERVICAL: ICD-10-CM

## 2025-05-12 RX ORDER — DIAZEPAM 10 MG/1
TABLET ORAL
Qty: 1 TABLET | Refills: 0 | Status: SHIPPED | OUTPATIENT
Start: 2025-05-12 | End: 2026-05-12

## 2025-05-12 NOTE — TELEPHONE ENCOUNTER
Patient had to reschedule her MRI due to claustrophobia. She requested valium for upcoming MRI scheduled for June 9.    MRI review was rescheduled accordingly.